# Patient Record
Sex: MALE | Race: WHITE | NOT HISPANIC OR LATINO | Employment: OTHER | URBAN - NONMETROPOLITAN AREA
[De-identification: names, ages, dates, MRNs, and addresses within clinical notes are randomized per-mention and may not be internally consistent; named-entity substitution may affect disease eponyms.]

---

## 2024-07-02 PROCEDURE — 93283 PRGRMG EVAL IMPLANTABLE DFB: CPT | Performed by: INTERNAL MEDICINE

## 2024-07-25 ENCOUNTER — OFFICE VISIT (OUTPATIENT)
Dept: CARDIOLOGY | Facility: CLINIC | Age: 74
End: 2024-07-25
Payer: MEDICARE

## 2024-07-25 VITALS
HEART RATE: 74 BPM | WEIGHT: 187.8 LBS | DIASTOLIC BLOOD PRESSURE: 78 MMHG | HEIGHT: 70 IN | SYSTOLIC BLOOD PRESSURE: 120 MMHG | BODY MASS INDEX: 26.88 KG/M2

## 2024-07-25 DIAGNOSIS — Z78.9 STATIN INTOLERANCE: ICD-10-CM

## 2024-07-25 DIAGNOSIS — I25.810 CORONARY ARTERY DISEASE INVOLVING CORONARY BYPASS GRAFT OF NATIVE HEART WITHOUT ANGINA PECTORIS: ICD-10-CM

## 2024-07-25 DIAGNOSIS — I44.2 COMPLETE HEART BLOCK (MULTI): ICD-10-CM

## 2024-07-25 DIAGNOSIS — Z95.810 PRESENCE OF COMBINATION INTERNAL CARDIAC DEFIBRILLATOR (ICD) AND PACEMAKER: ICD-10-CM

## 2024-07-25 DIAGNOSIS — I48.0 PAROXYSMAL ATRIAL FIBRILLATION (MULTI): ICD-10-CM

## 2024-07-25 DIAGNOSIS — I10 PRIMARY HYPERTENSION: ICD-10-CM

## 2024-07-25 DIAGNOSIS — Z87.891 FORMER SMOKER: ICD-10-CM

## 2024-07-25 DIAGNOSIS — E78.2 MIXED HYPERLIPIDEMIA: ICD-10-CM

## 2024-07-25 DIAGNOSIS — Z95.1 HISTORY OF CORONARY ARTERY BYPASS GRAFT: ICD-10-CM

## 2024-07-25 DIAGNOSIS — Z79.899 HIGH RISK MEDICATION USE: ICD-10-CM

## 2024-07-25 DIAGNOSIS — I50.22 CHRONIC SYSTOLIC HEART FAILURE (MULTI): Primary | ICD-10-CM

## 2024-07-25 PROCEDURE — 3074F SYST BP LT 130 MM HG: CPT | Performed by: INTERNAL MEDICINE

## 2024-07-25 PROCEDURE — 1036F TOBACCO NON-USER: CPT | Performed by: INTERNAL MEDICINE

## 2024-07-25 PROCEDURE — 3078F DIAST BP <80 MM HG: CPT | Performed by: INTERNAL MEDICINE

## 2024-07-25 PROCEDURE — 1159F MED LIST DOCD IN RCRD: CPT | Performed by: INTERNAL MEDICINE

## 2024-07-25 PROCEDURE — 3008F BODY MASS INDEX DOCD: CPT | Performed by: INTERNAL MEDICINE

## 2024-07-25 PROCEDURE — 99214 OFFICE O/P EST MOD 30 MIN: CPT | Performed by: INTERNAL MEDICINE

## 2024-07-25 RX ORDER — SACUBITRIL AND VALSARTAN 24; 26 MG/1; MG/1
1 TABLET, FILM COATED ORAL 2 TIMES DAILY
Qty: 180 TABLET | Refills: 3 | Status: CANCELLED | OUTPATIENT
Start: 2024-07-25 | End: 2025-07-25

## 2024-07-25 NOTE — PROGRESS NOTES
Subjective   Ron Lockwood is a 73 y.o. male       Chief Complaint    Follow-up          HPI   Patient is in the office for follow-up for the problems noted below.  He came with his wife and reports no symptoms of dyspnea palpitations or AICD discharge.  His lipid profile is ordered since he was started taking Repatha.  He had no side effects.  His device recently demonstrated evidence of atrial fibrillation lasting for hours at a time for which Eliquis was initiated.  He had no side effects.  He reports no bleeding complications.  His medical therapy was reviewed and since he has an ejection fraction of 35% we discussed other options of therapy as noted below.       Assessment/recommendations:     1-ischemic cardiomyopathy ejection fraction 35% by echocardiogram March 2024.  Presently compensated.  Will switch patient to Entresto 49-50 mg twice daily replacing ramipril.  Will titrate to the maximal dose on an accelerated schedule.  Will follow renal function.  2-CAD status post three-vessel bypass surgery in [place] 2012, had nuclear stress test at [facility] in [place] couple months ago which we will try to retrieve and also try to retrieve the bypass report from [place].  Currently on guideline directed medical therapy for ischemic heart disease and is without angina pectoris.  3-hyperlipidemia intolerant to high intensity statin, he was started recently on Repatha with no side effects, will follow his lipid profile in few weeks..  4-essential hypertension, currently under control  5-paroxysmal ventricular tachycardia with no recurrences.  No antiarrhythmic therapy is needed  6-complete heart block status post AICD/pacemaker  7-paroxysmal atrial fibrillation requiring anticoagulation with Eliquis, presently reload is not enough and remains asymptomatic to require antiarrhythmic therapy  8-high risk medication with Eliquis, to be monitored              Review of Systems   All other systems reviewed and are  "negative.           Vitals:    07/25/24 1052 07/25/24 1055   BP: 112/76 120/78   BP Location: Left arm Right arm   Patient Position: Sitting Sitting   Pulse: 74    Weight: 85.2 kg (187 lb 12.8 oz)    Height: 1.778 m (5' 10\")         Objective   Physical Exam  Constitutional:       Appearance: Normal appearance.   HENT:      Nose: Nose normal.   Neck:      Vascular: No carotid bruit.   Cardiovascular:      Rate and Rhythm: Normal rate.      Pulses: Normal pulses.      Heart sounds: Normal heart sounds.   Pulmonary:      Effort: Pulmonary effort is normal.   Abdominal:      General: Bowel sounds are normal.      Palpations: Abdomen is soft.   Musculoskeletal:         General: Normal range of motion.      Cervical back: Normal range of motion.      Right lower leg: No edema.      Left lower leg: No edema.   Skin:     General: Skin is warm and dry.   Neurological:      General: No focal deficit present.      Mental Status: He is alert.   Psychiatric:         Mood and Affect: Mood normal.         Behavior: Behavior normal.         Thought Content: Thought content normal.         Judgment: Judgment normal.         Allergies  Patient has no known allergies.     Current Medications    Current Outpatient Medications:     apixaban (Eliquis) 5 mg tablet, Take 1 tablet (5 mg) by mouth 2 times a day., Disp: 180 tablet, Rfl: 3    carvedilol (Coreg) 6.25 mg tablet, Take 1 tablet (6.25 mg) by mouth 2 times a day with meals., Disp: 180 tablet, Rfl: 3    clopidogrel (Plavix) 75 mg tablet, Take 1 tablet (75 mg) by mouth once daily., Disp: 90 tablet, Rfl: 3    evolocumab (Repatha) 140 mg/mL injection, Inject 1 mL (140 mg) under the skin every 14 (fourteen) days., Disp: 6 each, Rfl: 3    ezetimibe (Zetia) 10 mg tablet, Take 1 tablet (10 mg) by mouth once daily., Disp: 90 tablet, Rfl: 3    magnesium oxide (Mag-Ox) 400 mg tablet, Take 1 tablet (400 mg) by mouth once daily., Disp: 90 tablet, Rfl: 3    pantoprazole (ProtoNix) 40 mg EC " tablet, Take 1 tablet (40 mg) by mouth once daily in the morning. Take before meals. Do not crush, chew, or split., Disp: 90 tablet, Rfl: 3    pravastatin (Pravachol) 10 mg tablet, Take 1 tablet (10 mg) by mouth once daily at bedtime., Disp: 90 tablet, Rfl: 3    spironolactone (Aldactone) 25 mg tablet, Take 0.5 tablets (12.5 mg) by mouth once daily., Disp: 45 tablet, Rfl: 3    sacubitriL-valsartan (Entresto) 49-51 mg tablet, Take 1 tablet by mouth 2 times a day., Disp: 180 tablet, Rfl: 3                     Assessment/Plan   1. Chronic systolic heart failure (Multi)  Basic Metabolic Panel    sacubitriL-valsartan (Entresto) 49-51 mg tablet    Follow Up In Cardiology    Basic Metabolic Panel      2. Coronary artery disease involving coronary bypass graft of native heart without angina pectoris  Follow Up In Cardiology      3. Complete heart block (Multi)  Follow Up In Cardiology      4. Presence of combination internal cardiac defibrillator (ICD) and pacemaker        5. History of coronary artery bypass graft        6. Mixed hyperlipidemia        7. Primary hypertension        8. BMI 26.0-26.9,adult        9. Former smoker        10. Statin intolerance        11. Paroxysmal atrial fibrillation (Multi)        12. High risk medication use                 Scribe Attestation  By signing my name below, I, January Lay LPN   attest that this documentation has been prepared under the direction and in the presence of Yoselin Umana MD.     Provider Attestation - Scribe documentation    All medical record entries made by the Scribe were at my direction and personally dictated by me. I have reviewed the chart and agree that the record accurately reflects my personal performance of the history, physical exam, discussion and plan.

## 2024-07-25 NOTE — LETTER
July 25, 2024     Thompson Hurst MD  [address] [facility] [address]  [facility] [address]    Patient: Ron Lockwood   YOB: 1950   Date of Visit: 7/25/2024       Dear Dr. Thompson Hurst MD:    Thank you for referring Ron Lockwood to me for evaluation. Below are my notes for this consultation.  If you have questions, please do not hesitate to call me. I look forward to following your patient along with you.       Sincerely,     Yoselin Umana MD      CC: No Recipients  ______________________________________________________________________________________    Subjective   Ron Lockwood is a 73 y.o. male       Chief Complaint    Follow-up          HPI   Patient is in the office for follow-up for the problems noted below.  He came with his wife and reports no symptoms of dyspnea palpitations or AICD discharge.  His lipid profile is ordered since he was started taking Repatha.  He had no side effects.  His device recently demonstrated evidence of atrial fibrillation lasting for hours at a time for which Eliquis was initiated.  He had no side effects.  He reports no bleeding complications.  His medical therapy was reviewed and since he has an ejection fraction of 35% we discussed other options of therapy as noted below.       Assessment/recommendations:     1-ischemic cardiomyopathy ejection fraction 35% by echocardiogram March 2024.  Presently compensated.  Will switch patient to Entresto 49-50 mg twice daily replacing ramipril.  Will titrate to the maximal dose on an accelerated schedule.  Will follow renal function.  2-CAD status post three-vessel bypass surgery in [place] 2012, had nuclear stress test at [facility] in [place] couple months ago which we will try to retrieve and also try to retrieve the bypass report from [place].  Currently on guideline directed medical therapy for ischemic heart disease and is without angina pectoris.  3-hyperlipidemia intolerant to high intensity statin, he  "was started recently on Repatha with no side effects, will follow his lipid profile in few weeks..  4-essential hypertension, currently under control  5-paroxysmal ventricular tachycardia with no recurrences.  No antiarrhythmic therapy is needed  6-complete heart block status post AICD/pacemaker  7-paroxysmal atrial fibrillation requiring anticoagulation with Eliquis, presently reload is not enough and remains asymptomatic to require antiarrhythmic therapy  8-high risk medication with Eliquis, to be monitored              Review of Systems   All other systems reviewed and are negative.           Vitals:    07/25/24 1052 07/25/24 1055   BP: 112/76 120/78   BP Location: Left arm Right arm   Patient Position: Sitting Sitting   Pulse: 74    Weight: 85.2 kg (187 lb 12.8 oz)    Height: 1.778 m (5' 10\")         Objective   Physical Exam  Constitutional:       Appearance: Normal appearance.   HENT:      Nose: Nose normal.   Neck:      Vascular: No carotid bruit.   Cardiovascular:      Rate and Rhythm: Normal rate.      Pulses: Normal pulses.      Heart sounds: Normal heart sounds.   Pulmonary:      Effort: Pulmonary effort is normal.   Abdominal:      General: Bowel sounds are normal.      Palpations: Abdomen is soft.   Musculoskeletal:         General: Normal range of motion.      Cervical back: Normal range of motion.      Right lower leg: No edema.      Left lower leg: No edema.   Skin:     General: Skin is warm and dry.   Neurological:      General: No focal deficit present.      Mental Status: He is alert.   Psychiatric:         Mood and Affect: Mood normal.         Behavior: Behavior normal.         Thought Content: Thought content normal.         Judgment: Judgment normal.         Allergies  Patient has no known allergies.     Current Medications    Current Outpatient Medications:   •  apixaban (Eliquis) 5 mg tablet, Take 1 tablet (5 mg) by mouth 2 times a day., Disp: 180 tablet, Rfl: 3  •  carvedilol (Coreg) 6.25 mg " tablet, Take 1 tablet (6.25 mg) by mouth 2 times a day with meals., Disp: 180 tablet, Rfl: 3  •  clopidogrel (Plavix) 75 mg tablet, Take 1 tablet (75 mg) by mouth once daily., Disp: 90 tablet, Rfl: 3  •  evolocumab (Repatha) 140 mg/mL injection, Inject 1 mL (140 mg) under the skin every 14 (fourteen) days., Disp: 6 each, Rfl: 3  •  ezetimibe (Zetia) 10 mg tablet, Take 1 tablet (10 mg) by mouth once daily., Disp: 90 tablet, Rfl: 3  •  magnesium oxide (Mag-Ox) 400 mg tablet, Take 1 tablet (400 mg) by mouth once daily., Disp: 90 tablet, Rfl: 3  •  pantoprazole (ProtoNix) 40 mg EC tablet, Take 1 tablet (40 mg) by mouth once daily in the morning. Take before meals. Do not crush, chew, or split., Disp: 90 tablet, Rfl: 3  •  pravastatin (Pravachol) 10 mg tablet, Take 1 tablet (10 mg) by mouth once daily at bedtime., Disp: 90 tablet, Rfl: 3  •  spironolactone (Aldactone) 25 mg tablet, Take 0.5 tablets (12.5 mg) by mouth once daily., Disp: 45 tablet, Rfl: 3  •  sacubitriL-valsartan (Entresto) 49-51 mg tablet, Take 1 tablet by mouth 2 times a day., Disp: 180 tablet, Rfl: 3                     Assessment/Plan   1. Chronic systolic heart failure (Multi)  Basic Metabolic Panel    sacubitriL-valsartan (Entresto) 49-51 mg tablet    Follow Up In Cardiology    Basic Metabolic Panel      2. Coronary artery disease involving coronary bypass graft of native heart without angina pectoris  Follow Up In Cardiology      3. Complete heart block (Multi)  Follow Up In Cardiology      4. Presence of combination internal cardiac defibrillator (ICD) and pacemaker        5. History of coronary artery bypass graft        6. Mixed hyperlipidemia        7. Primary hypertension        8. BMI 26.0-26.9,adult        9. Former smoker        10. Statin intolerance        11. Paroxysmal atrial fibrillation (Multi)        12. High risk medication use                 Scribe Attestation  By signing my name below, Ilda MCCLAIN LPN  , Scribe   attest that this  documentation has been prepared under the direction and in the presence of Yoselin Umana MD.     Provider Attestation - Scribe documentation    All medical record entries made by the Scribe were at my direction and personally dictated by me. I have reviewed the chart and agree that the record accurately reflects my personal performance of the history, physical exam, discussion and plan.

## 2024-07-25 NOTE — PATIENT INSTRUCTIONS
Please bring all medicines, vitamins, and herbal supplements with you when you come to the office.    Prescriptions will not be filled unless you are compliant with your follow up appointments or have a follow up appointment scheduled as per instruction of your physician. Refills should be requested at the time of your visit.     BMI was above normal measurement. Current weight: 85.2 kg (187 lb 12.8 oz)  Weight change since last visit (-) denotes wt loss -1.2 lbs   Weight loss needed to achieve BMI 25: 13.9 Lbs  Weight loss needed to achieve BMI 30: -20.8 Lbs  Provided instructions on dietary changes  Provided instructions on exercise.    Pacemaker/Defibrillator follow up per routine

## 2024-09-16 ENCOUNTER — TELEPHONE (OUTPATIENT)
Dept: CARDIOLOGY | Facility: CLINIC | Age: 74
End: 2024-09-16
Payer: MEDICARE

## 2024-09-16 DIAGNOSIS — Z95.810 AICD (AUTOMATIC CARDIOVERTER/DEFIBRILLATOR) PRESENT: ICD-10-CM

## 2024-09-16 DIAGNOSIS — I50.22 CHRONIC SYSTOLIC HEART FAILURE: ICD-10-CM

## 2024-09-16 DIAGNOSIS — I44.2 COMPLETE HEART BLOCK: ICD-10-CM

## 2024-09-16 NOTE — TELEPHONE ENCOUNTER
Cardiac device check referral at [facility] order prepped for physician signature. Previously Dr. Parker Osborn MD patient.

## 2024-09-19 ENCOUNTER — APPOINTMENT (OUTPATIENT)
Dept: CARDIOLOGY | Facility: CLINIC | Age: 74
End: 2024-09-19
Payer: MEDICARE

## 2024-09-27 ENCOUNTER — TELEPHONE (OUTPATIENT)
Dept: CARDIOLOGY | Facility: CLINIC | Age: 74
End: 2024-09-27
Payer: MEDICARE

## 2024-09-27 NOTE — TELEPHONE ENCOUNTER
Patient's wife phoned that the price of Eliquis went up and can no longer afford this medication. Requesting a cheaper alternative. Please advise.    To Dr. Yoselin Umana MD

## 2024-10-01 ENCOUNTER — APPOINTMENT (OUTPATIENT)
Dept: CARDIOLOGY | Facility: CLINIC | Age: 74
End: 2024-10-01
Payer: MEDICARE

## 2024-10-01 VITALS
BODY MASS INDEX: 26.66 KG/M2 | WEIGHT: 186.2 LBS | DIASTOLIC BLOOD PRESSURE: 74 MMHG | HEIGHT: 70 IN | HEART RATE: 46 BPM | SYSTOLIC BLOOD PRESSURE: 110 MMHG

## 2024-10-01 DIAGNOSIS — I50.22 CHRONIC SYSTOLIC HEART FAILURE: ICD-10-CM

## 2024-10-01 DIAGNOSIS — Z87.891 FORMER SMOKER: ICD-10-CM

## 2024-10-01 PROCEDURE — 3078F DIAST BP <80 MM HG: CPT | Performed by: INTERNAL MEDICINE

## 2024-10-01 PROCEDURE — 99212 OFFICE O/P EST SF 10 MIN: CPT | Performed by: INTERNAL MEDICINE

## 2024-10-01 PROCEDURE — 1159F MED LIST DOCD IN RCRD: CPT | Performed by: INTERNAL MEDICINE

## 2024-10-01 PROCEDURE — 3008F BODY MASS INDEX DOCD: CPT | Performed by: INTERNAL MEDICINE

## 2024-10-01 PROCEDURE — 3074F SYST BP LT 130 MM HG: CPT | Performed by: INTERNAL MEDICINE

## 2024-10-01 PROCEDURE — 1036F TOBACCO NON-USER: CPT | Performed by: INTERNAL MEDICINE

## 2024-10-01 NOTE — PATIENT INSTRUCTIONS
BMI was above normal measurement. Current weight: 84.5 kg (186 lb 3.2 oz)  Weight change since last visit (-) denotes wt loss -1.6 lbs   Weight loss needed to achieve BMI 25: 12.3 Lbs  Weight loss needed to achieve BMI 30: -22.4 Lbs  Provided instructions on dietary changes  Provided instructions on exercise.    Feb visit as scheduled  Increase Entresto to 97/107  B/p check one month  Lab work [facility]

## 2024-10-01 NOTE — PROGRESS NOTES
"Subjective   Ron Lockwood is a 73 y.o. male       Chief Complaint    Follow-up          HPI   Patient is in the office for further titration of heart failure therapy.  He tolerated medium dose Entresto and his blood pressure looks normal.  He has no symptoms of dizziness.  Will increase the Entresto up to 97/23 mg twice daily, we will follow him in 4 weeks and have basic metabolic profile in 2 weeks.  He was advised to watch his blood pressure reading on regular basis until he sees me next time and if he sees a significant drop he need to let me know.  ROS         Vitals:    10/01/24 0930 10/01/24 0933   BP: 128/80 110/74   BP Location: Left arm Right arm   Patient Position: Sitting Sitting   Pulse: (!) 46    Weight: 84.5 kg (186 lb 3.2 oz)    Height: 1.778 m (5' 10\")         Objective   Physical Exam    Allergies  Patient has no known allergies.     Current Medications    Current Outpatient Medications:     apixaban (Eliquis) 5 mg tablet, Take 1 tablet (5 mg) by mouth 2 times a day., Disp: 180 tablet, Rfl: 3    carvedilol (Coreg) 6.25 mg tablet, Take 1 tablet (6.25 mg) by mouth 2 times a day with meals., Disp: 180 tablet, Rfl: 3    clopidogrel (Plavix) 75 mg tablet, Take 1 tablet (75 mg) by mouth once daily., Disp: 90 tablet, Rfl: 3    evolocumab (Repatha) 140 mg/mL injection, Inject 1 mL (140 mg) under the skin every 14 (fourteen) days., Disp: 6 each, Rfl: 3    ezetimibe (Zetia) 10 mg tablet, Take 1 tablet (10 mg) by mouth once daily., Disp: 90 tablet, Rfl: 3    magnesium oxide (Mag-Ox) 400 mg tablet, Take 1 tablet (400 mg) by mouth once daily., Disp: 90 tablet, Rfl: 3    pantoprazole (ProtoNix) 40 mg EC tablet, Take 1 tablet (40 mg) by mouth once daily in the morning. Take before meals. Do not crush, chew, or split., Disp: 90 tablet, Rfl: 3    pravastatin (Pravachol) 10 mg tablet, Take 1 tablet (10 mg) by mouth once daily at bedtime., Disp: 90 tablet, Rfl: 3    spironolactone (Aldactone) 25 mg tablet, Take 0.5 " tablets (12.5 mg) by mouth once daily., Disp: 45 tablet, Rfl: 3    sacubitriL-valsartan (Entresto)  mg tablet, Take 1 tablet by mouth 2 times a day., Disp: 180 tablet, Rfl: 3                     Assessment/Plan   1. Chronic systolic heart failure  Follow Up In Cardiology    Basic Metabolic Panel    Follow Up In Cardiology    sacubitriL-valsartan (Entresto)  mg tablet      2. BMI 26.0-26.9,adult        3. Former smoker                 Scribe Attestation  By signing my name below, Vilma MCCLAIN LPN   , January   attest that this documentation has been prepared under the direction and in the presence of Yoselin Umana MD.     Provider Attestation - Scribe documentation    All medical record entries made by the Scribe were at my direction and personally dictated by me. I have reviewed the chart and agree that the record accurately reflects my personal performance of the history, physical exam, discussion and plan.

## 2024-10-03 NOTE — TELEPHONE ENCOUNTER
Wife returned call. She will discuss with patient and call back with decision. [facility] will also [facility] with [facility] regarding [facility]

## 2024-10-08 NOTE — TELEPHONE ENCOUNTER
Wife returned call. States they filled 3 medications for one month and cost was $600. He is actively working with [facility] for [facility]. She will cont to keep office updated.

## 2024-10-14 NOTE — TELEPHONE ENCOUNTER
Phoned patient and left detailed VM to return call. Will mail letter due have several unsuccessful attempts to reach patient.

## 2024-10-14 NOTE — TELEPHONE ENCOUNTER
Patient's wife phoned is currently going through [facility] Eliquis Rx. Will keep office informed. Patient is compliant with Eliquis therapy.

## 2024-10-23 NOTE — TELEPHONE ENCOUNTER
Ortega from the [facility] phoned that patient never started Entresto due to cost and he had questions that it was titrated up on 10/1/2024. Questioning what dose he should be on once approved. Patient never stopped Ramipril 10mg daily and is still taking it. Phoned wife back to verify, states this is correct. Please advise.    To Dr. Yoselin Umana MD      [facility] phone number: [phone] extension [phone]  Fax number: [phone]

## 2024-10-23 NOTE — TELEPHONE ENCOUNTER
Patient's wife phoned stating the [facility] was [facility] the last two Dr. Yoselin Umana MD OV notes to be faxed to [phone] to get [facility] through the [facility]. Faxed.

## 2024-10-24 NOTE — TELEPHONE ENCOUNTER
Phoned [facility]te[facility] from the [facility] with Dr. Yoselin Umana MD response. States they will mail the Enresto out patient possibly today. Faxed encounter to [facility] as requested. Phoned wife with Dr. Yoselin Umana MD response and verbalized understanding to stay on the Ramipril only until received Entresto from the [facility]. Also, patient will call to make an appointment for 4 weeks with Dr. Yoselin Umana MD after starting Entresto. Verbalized understanding.    To Dr. Yoselin Umana MD

## 2024-11-26 ENCOUNTER — APPOINTMENT (OUTPATIENT)
Dept: CARDIOLOGY | Facility: CLINIC | Age: 74
End: 2024-11-26
Payer: MEDICARE

## 2024-11-26 VITALS
WEIGHT: 187 LBS | HEIGHT: 70 IN | HEART RATE: 60 BPM | DIASTOLIC BLOOD PRESSURE: 64 MMHG | BODY MASS INDEX: 26.77 KG/M2 | SYSTOLIC BLOOD PRESSURE: 110 MMHG

## 2024-11-26 DIAGNOSIS — I50.22 CHRONIC SYSTOLIC HEART FAILURE: ICD-10-CM

## 2024-11-26 DIAGNOSIS — Z87.891 FORMER SMOKER: ICD-10-CM

## 2024-11-26 PROCEDURE — 3074F SYST BP LT 130 MM HG: CPT | Performed by: INTERNAL MEDICINE

## 2024-11-26 PROCEDURE — 99212 OFFICE O/P EST SF 10 MIN: CPT | Performed by: INTERNAL MEDICINE

## 2024-11-26 PROCEDURE — 3008F BODY MASS INDEX DOCD: CPT | Performed by: INTERNAL MEDICINE

## 2024-11-26 PROCEDURE — 3078F DIAST BP <80 MM HG: CPT | Performed by: INTERNAL MEDICINE

## 2024-11-26 NOTE — PROGRESS NOTES
"Subjective   Ron Lockwood is a 74 y.o. male       Chief Complaint    Follow-up          HPI   Patient is in the office for titration of heart failure therapy.  Since he was last seen in the office we learned that he has not been taking the Entresto due to cost.  [facility] and has been taking it for 2 weeks at the lowest dose.  He continued to have symptoms shortness of breath consistent with heart failure.  Emphasized the need for being clear with us on his medications and fortunately his wife was present today and she is the  for that issue.  We agreed to get a basic metabolic profile now and in the absence of any concern we will double the Entresto up to 49/51 mg twice daily and bring him back in 3 weeks for BP check and basic metabolic profile for final titration of the medicine.  He is scheduled to see me in February 2025 and that office visit will be left unchanged.  ROS         Vitals:    11/26/24 0947 11/26/24 0949   BP: 114/68 110/64   BP Location: Left arm Right arm   Patient Position: Sitting Sitting   Pulse: 60    Weight: 84.8 kg (187 lb)    Height: 1.778 m (5' 10\")         Objective   Physical Exam    Allergies  Patient has no known allergies.     Current Medications    Current Outpatient Medications:     apixaban (Eliquis) 5 mg tablet, Take 1 tablet (5 mg) by mouth 2 times a day., Disp: 180 tablet, Rfl: 3    carvedilol (Coreg) 6.25 mg tablet, Take 1 tablet (6.25 mg) by mouth 2 times a day with meals., Disp: 180 tablet, Rfl: 3    clopidogrel (Plavix) 75 mg tablet, Take 1 tablet (75 mg) by mouth once daily., Disp: 90 tablet, Rfl: 3    evolocumab (Repatha) 140 mg/mL injection, Inject 1 mL (140 mg) under the skin every 14 (fourteen) days., Disp: 6 each, Rfl: 3    ezetimibe (Zetia) 10 mg tablet, Take 1 tablet (10 mg) by mouth once daily., Disp: 90 tablet, Rfl: 3    magnesium oxide (Mag-Ox) 400 mg tablet, Take 1 tablet (400 mg) by mouth once daily., Disp: 90 tablet, Rfl: 3    " pantoprazole (ProtoNix) 40 mg EC tablet, Take 1 tablet (40 mg) by mouth once daily in the morning. Take before meals. Do not crush, chew, or split., Disp: 90 tablet, Rfl: 3    pravastatin (Pravachol) 10 mg tablet, Take 1 tablet (10 mg) by mouth once daily at bedtime., Disp: 90 tablet, Rfl: 3    sacubitriL-valsartan (Entresto) 24-26 mg tablet, Take 1 tablet by mouth 2 times a day., Disp: , Rfl:     spironolactone (Aldactone) 25 mg tablet, Take 0.5 tablets (12.5 mg) by mouth once daily., Disp: 45 tablet, Rfl: 3                     Assessment/Plan   1. Chronic systolic heart failure  Follow Up In Cardiology    Basic Metabolic Panel    Follow Up In Cardiology    Basic Metabolic Panel      2. BMI 26.0-26.9,adult        3. Former smoker                 Scribe Attestation  By signing my name below, Vilma MCCLAIN LPN, Scribe   attest that this documentation has been prepared under the direction and in the presence of Yoselin Umana MD.     Provider Attestation - Scribe documentation    All medical record entries made by the Scribe were at my direction and personally dictated by me. I have reviewed the chart and agree that the record accurately reflects my personal performance of the history, physical exam, discussion and plan.

## 2024-11-26 NOTE — PATIENT INSTRUCTIONS
BMI was above normal measurement. Current weight: 84.8 kg (187 lb)  Weight change since last visit (-) denotes wt loss 0.8 lbs   Weight loss needed to achieve BMI 25: 13.1 Lbs  Weight loss needed to achieve BMI 30: -21.6 Lbs  Provided instructions on dietary changes  Provided instructions on exercise.    Lab work  Feb visit as scheduled

## 2024-12-17 ENCOUNTER — APPOINTMENT (OUTPATIENT)
Dept: CARDIOLOGY | Facility: CLINIC | Age: 74
End: 2024-12-17
Payer: MEDICARE

## 2024-12-17 VITALS
SYSTOLIC BLOOD PRESSURE: 114 MMHG | HEART RATE: 50 BPM | HEIGHT: 70 IN | WEIGHT: 190 LBS | BODY MASS INDEX: 27.2 KG/M2 | DIASTOLIC BLOOD PRESSURE: 64 MMHG

## 2024-12-17 DIAGNOSIS — I50.22 CHRONIC SYSTOLIC HEART FAILURE: ICD-10-CM

## 2024-12-17 DIAGNOSIS — Z87.891 FORMER SMOKER: ICD-10-CM

## 2024-12-17 PROCEDURE — 3074F SYST BP LT 130 MM HG: CPT | Performed by: INTERNAL MEDICINE

## 2024-12-17 PROCEDURE — 3008F BODY MASS INDEX DOCD: CPT | Performed by: INTERNAL MEDICINE

## 2024-12-17 PROCEDURE — 1159F MED LIST DOCD IN RCRD: CPT | Performed by: INTERNAL MEDICINE

## 2024-12-17 PROCEDURE — 99212 OFFICE O/P EST SF 10 MIN: CPT | Performed by: INTERNAL MEDICINE

## 2024-12-17 PROCEDURE — 3078F DIAST BP <80 MM HG: CPT | Performed by: INTERNAL MEDICINE

## 2024-12-17 RX ORDER — SACUBITRIL AND VALSARTAN 49; 51 MG/1; MG/1
1 TABLET, FILM COATED ORAL 2 TIMES DAILY
Qty: 180 TABLET | Refills: 3 | Status: SHIPPED | OUTPATIENT
Start: 2024-12-17 | End: 2025-12-17

## 2024-12-17 NOTE — PATIENT INSTRUCTIONS
Please bring all medicines, vitamins, and herbal supplements with you when you come to the office.    Prescriptions will not be filled unless you are compliant with your follow up appointments or have a follow up appointment scheduled as per instruction of your physician. Refills should be requested at the time of your visit.       Increase Entresto to 49/51 one tablet 2 times daily  Lab 2 weeks  B/p check in 2 weeks  Feb visit pending

## 2024-12-17 NOTE — PROGRESS NOTES
"Subjective   Ron Lockwood is a 74 y.o. male       Chief Complaint    BP OV          HPI   Patient is in the office for titration of heart failure therapy with Entresto.  Since initiation of 24/26 mg twice daily he has tolerated the initiation fairly well.  Renal function is normal.  Will double the Entresto up to 49/51 mg twice daily, follow his labs in few weeks and come back in 6 weeks for further titration if possible.  ROS         Vitals:    12/17/24 0936 12/17/24 0937   BP: 116/70 114/64   BP Location: Right arm Left arm   Patient Position: Sitting Sitting   Pulse: 50    Weight: 86.2 kg (190 lb)    Height: 1.778 m (5' 10\")         Objective   Physical Exam    Allergies  Patient has no known allergies.     Current Medications    Current Outpatient Medications:     apixaban (Eliquis) 5 mg tablet, Take 1 tablet (5 mg) by mouth 2 times a day., Disp: 180 tablet, Rfl: 3    carvedilol (Coreg) 6.25 mg tablet, Take 1 tablet (6.25 mg) by mouth 2 times a day with meals., Disp: 180 tablet, Rfl: 3    clopidogrel (Plavix) 75 mg tablet, Take 1 tablet (75 mg) by mouth once daily., Disp: 90 tablet, Rfl: 3    evolocumab (Repatha) 140 mg/mL injection, Inject 1 mL (140 mg) under the skin every 14 (fourteen) days., Disp: 6 each, Rfl: 3    ezetimibe (Zetia) 10 mg tablet, Take 1 tablet (10 mg) by mouth once daily., Disp: 90 tablet, Rfl: 3    magnesium oxide (Mag-Ox) 400 mg tablet, Take 1 tablet (400 mg) by mouth once daily., Disp: 90 tablet, Rfl: 3    pantoprazole (ProtoNix) 40 mg EC tablet, Take 1 tablet (40 mg) by mouth once daily in the morning. Take before meals. Do not crush, chew, or split., Disp: 90 tablet, Rfl: 3    pravastatin (Pravachol) 10 mg tablet, Take 1 tablet (10 mg) by mouth once daily at bedtime., Disp: 90 tablet, Rfl: 3    spironolactone (Aldactone) 25 mg tablet, Take 0.5 tablets (12.5 mg) by mouth once daily., Disp: 45 tablet, Rfl: 3    sacubitriL-valsartan (Entresto) 49-51 mg tablet, Take 1 tablet by mouth 2 " times a day., Disp: 180 tablet, Rfl: 3                     Assessment/Plan   1. Chronic systolic heart failure  Follow Up In Cardiology    sacubitriL-valsartan (Entresto) 49-51 mg tablet    Basic Metabolic Panel    Follow Up In Cardiology    Basic Metabolic Panel      2. BMI 27.0-27.9,adult        3. Former smoker                 Scribe Attestation  By signing my name below, Vilma MCCLAIN LPN   , Scribe   attest that this documentation has been prepared under the direction and in the presence of Yoselin Umana MD.     Provider Attestation - Scribe documentation    All medical record entries made by the Scribe were at my direction and personally dictated by me. I have reviewed the chart and agree that the record accurately reflects my personal performance of the history, physical exam, discussion and plan.

## 2025-01-10 ENCOUNTER — APPOINTMENT (OUTPATIENT)
Dept: CARDIOLOGY | Facility: CLINIC | Age: 75
End: 2025-01-10
Payer: MEDICARE

## 2025-01-10 VITALS
BODY MASS INDEX: 27.11 KG/M2 | SYSTOLIC BLOOD PRESSURE: 116 MMHG | WEIGHT: 189.4 LBS | DIASTOLIC BLOOD PRESSURE: 84 MMHG | HEIGHT: 70 IN | HEART RATE: 44 BPM

## 2025-01-10 DIAGNOSIS — I10 HYPERTENSION, UNSPECIFIED TYPE: ICD-10-CM

## 2025-01-10 DIAGNOSIS — I50.22 CHRONIC SYSTOLIC HEART FAILURE: ICD-10-CM

## 2025-01-10 PROCEDURE — 99212 OFFICE O/P EST SF 10 MIN: CPT | Performed by: INTERNAL MEDICINE

## 2025-01-10 PROCEDURE — 3074F SYST BP LT 130 MM HG: CPT | Performed by: INTERNAL MEDICINE

## 2025-01-10 PROCEDURE — 1159F MED LIST DOCD IN RCRD: CPT | Performed by: INTERNAL MEDICINE

## 2025-01-10 PROCEDURE — 3008F BODY MASS INDEX DOCD: CPT | Performed by: INTERNAL MEDICINE

## 2025-01-10 PROCEDURE — 1036F TOBACCO NON-USER: CPT | Performed by: INTERNAL MEDICINE

## 2025-01-10 PROCEDURE — 3079F DIAST BP 80-89 MM HG: CPT | Performed by: INTERNAL MEDICINE

## 2025-01-10 RX ORDER — CARVEDILOL 3.12 MG/1
3.12 TABLET ORAL 2 TIMES DAILY
Qty: 180 TABLET | Refills: 3 | Status: SHIPPED | OUTPATIENT
Start: 2025-01-10 | End: 2026-01-10

## 2025-01-10 RX ORDER — SACUBITRIL AND VALSARTAN 97; 103 MG/1; MG/1
1 TABLET, FILM COATED ORAL 2 TIMES DAILY
Qty: 180 TABLET | Refills: 3 | Status: SHIPPED | OUTPATIENT
Start: 2025-01-10 | End: 2026-01-10

## 2025-01-10 NOTE — PROGRESS NOTES
"Subjective   Ron Lockwood is a 74 y.o. male       Chief Complaint    Blood Pressure Check          HPI   Patient is in the office for titration of heart failure therapy.  He tolerated maximal dose of Entresto fairly well but was found to be bradycardic today with heart rate of 45 to 50 bpm.  Will reduce carvedilol down to 3.125 mg twice daily and maintain the rest of the medication.  He will come back in couple of months at that time we will initiate therapy with Jardiance and the rationale for that was explained to the patient.  ROS         Vitals:    01/10/25 1330 01/10/25 1334   BP: 118/84 116/84   BP Location: Left arm    Patient Position: Sitting    Pulse: (!) 44    Weight: 85.9 kg (189 lb 6.4 oz)    Height: 1.778 m (5' 10\")         Objective   Physical Exam    Allergies  Patient has no known allergies.     Current Medications    Current Outpatient Medications:     apixaban (Eliquis) 5 mg tablet, Take 1 tablet (5 mg) by mouth 2 times a day., Disp: 180 tablet, Rfl: 3    clopidogrel (Plavix) 75 mg tablet, Take 1 tablet (75 mg) by mouth once daily., Disp: 90 tablet, Rfl: 3    evolocumab (Repatha) 140 mg/mL injection, Inject 1 mL (140 mg) under the skin every 14 (fourteen) days., Disp: 6 each, Rfl: 3    ezetimibe (Zetia) 10 mg tablet, Take 1 tablet (10 mg) by mouth once daily., Disp: 90 tablet, Rfl: 3    magnesium oxide (Mag-Ox) 400 mg tablet, Take 1 tablet (400 mg) by mouth once daily., Disp: 90 tablet, Rfl: 3    pantoprazole (ProtoNix) 40 mg EC tablet, Take 1 tablet (40 mg) by mouth once daily in the morning. Take before meals. Do not crush, chew, or split., Disp: 90 tablet, Rfl: 3    pravastatin (Pravachol) 10 mg tablet, Take 1 tablet (10 mg) by mouth once daily at bedtime., Disp: 90 tablet, Rfl: 3    spironolactone (Aldactone) 25 mg tablet, Take 0.5 tablets (12.5 mg) by mouth once daily., Disp: 45 tablet, Rfl: 3    carvedilol (Coreg) 3.125 mg tablet, Take 1 tablet (3.125 mg) by mouth 2 times a day., Disp: " 180 tablet, Rfl: 3    sacubitriL-valsartan (Entresto)  mg tablet, Take 1 tablet by mouth 2 times a day., Disp: 180 tablet, Rfl: 3                     Assessment/Plan   1. Chronic systolic heart failure  Follow Up In Cardiology    carvedilol (Coreg) 3.125 mg tablet    sacubitriL-valsartan (Entresto)  mg tablet      2. Hypertension, unspecified type  carvedilol (Coreg) 3.125 mg tablet               Scribe Attestation  By signing my name below, IIlda Scribe   attest that this documentation has been prepared under the direction and in the presence of Yoselin Umana MD.     Provider Attestation - Scribe documentation    All medical record entries made by the Scribe were at my direction and personally dictated by me. I have reviewed the chart and agree that the record accurately reflects my personal performance of the history, physical exam, discussion and plan.

## 2025-02-26 ENCOUNTER — APPOINTMENT (OUTPATIENT)
Dept: CARDIOLOGY | Facility: CLINIC | Age: 75
End: 2025-02-26
Payer: MEDICARE

## 2025-02-26 VITALS
SYSTOLIC BLOOD PRESSURE: 102 MMHG | WEIGHT: 191 LBS | BODY MASS INDEX: 27.35 KG/M2 | DIASTOLIC BLOOD PRESSURE: 70 MMHG | HEART RATE: 56 BPM | HEIGHT: 70 IN

## 2025-02-26 DIAGNOSIS — I47.29 PAROXYSMAL VENTRICULAR TACHYCARDIA (MULTI): ICD-10-CM

## 2025-02-26 DIAGNOSIS — Z95.1 HISTORY OF CORONARY ARTERY BYPASS GRAFT: ICD-10-CM

## 2025-02-26 DIAGNOSIS — I44.2 COMPLETE HEART BLOCK: ICD-10-CM

## 2025-02-26 DIAGNOSIS — I50.22 CHRONIC SYSTOLIC HEART FAILURE: ICD-10-CM

## 2025-02-26 DIAGNOSIS — I25.810 CORONARY ARTERY DISEASE INVOLVING CORONARY BYPASS GRAFT OF NATIVE HEART WITHOUT ANGINA PECTORIS: Primary | ICD-10-CM

## 2025-02-26 DIAGNOSIS — E78.2 MIXED HYPERLIPIDEMIA: ICD-10-CM

## 2025-02-26 DIAGNOSIS — Z87.891 FORMER SMOKER: ICD-10-CM

## 2025-02-26 DIAGNOSIS — I10 PRIMARY HYPERTENSION: ICD-10-CM

## 2025-02-26 DIAGNOSIS — Z95.810 AICD (AUTOMATIC CARDIOVERTER/DEFIBRILLATOR) PRESENT: ICD-10-CM

## 2025-02-26 PROCEDURE — 1159F MED LIST DOCD IN RCRD: CPT | Performed by: INTERNAL MEDICINE

## 2025-02-26 PROCEDURE — 99214 OFFICE O/P EST MOD 30 MIN: CPT | Performed by: INTERNAL MEDICINE

## 2025-02-26 PROCEDURE — 3074F SYST BP LT 130 MM HG: CPT | Performed by: INTERNAL MEDICINE

## 2025-02-26 PROCEDURE — 1036F TOBACCO NON-USER: CPT | Performed by: INTERNAL MEDICINE

## 2025-02-26 PROCEDURE — 3008F BODY MASS INDEX DOCD: CPT | Performed by: INTERNAL MEDICINE

## 2025-02-26 PROCEDURE — 3078F DIAST BP <80 MM HG: CPT | Performed by: INTERNAL MEDICINE

## 2025-02-26 NOTE — PATIENT INSTRUCTIONS
Please bring all medicines, vitamins, and herbal supplements with you when you come to the office.    Prescriptions will not be filled unless you are compliant with your follow up appointments or have a follow up appointment scheduled as per instruction of your physician. Refills should be requested at the time of your visit.     Limited Echo in June  Follow up afterwards  Jardiance 25 mg one daily

## 2025-02-26 NOTE — LETTER
February 26, 2025     Thompson Hurst MD  [facility] [address]  [place] [address]    Patient: Ron Lockwood   YOB: 1950   Date of Visit: 2/26/2025       Dear Dr. Thompson Hurst MD:    Thank you for referring Ron Lockwood to me for evaluation. Below are my notes for this consultation.  If you have questions, please do not hesitate to call me. I look forward to following your patient along with you.       Sincerely,     Yoselin Umana MD      CC: No Recipients  ______________________________________________________________________________________    Subjective   Ron Lockwood is a 74 y.o. male       Chief Complaint    Follow-up          HPI     Patient is in the office for follow-up for the problems noted below accompanied by his wife.  Since he was last seen in the office couple months ago he has done very well and continues to do well without any dyspnea orthopnea PND lower extremity edema no AICD discharges and no need for nitroglycerin.  His examination today was unremarkable except for slight overweight.    Assessment/recommendations:     1-ischemic cardiomyopathy ejection fraction 35% by echocardiogram March 2024.  Presently compensated.  Will continue GDMT at maximally tolerated doses and add Jardiance 25 mg daily to complete the guideline recommended therapy and follow ejection fraction in 6 months.  2-CAD status post three-vessel bypass surgery in [place] 2012, had nuclear stress test at [facility] in [place] couple months ago which we will try to retrieve and also try to retrieve the bypass report from [place].  Currently on guideline directed medical therapy for ischemic heart disease and is without angina pectoris.  3-hyperlipidemia intolerant to statin currently on 10 mg daily of pravastatin along with ezetimibe 10 mg daily.  Remains under control  4-hypertension, currently under control  5-paroxysmal ventricular tachycardia with no recurrences.  No antiarrhythmic  "therapy is needed patient has AICD followed in the pacemaker clinic  6-complete heart block status post AICD/pacemaker followed in pacemaker clinic  7-overweight, encouraged the patient to work harder on losing weight.  .  Review of Systems   All other systems reviewed and are negative.           Vitals:    02/26/25 0919   BP: 102/70   BP Location: Left arm   Patient Position: Sitting   Pulse: 56   Weight: 86.6 kg (191 lb)   Height: 1.778 m (5' 10\")        Objective   Physical Exam  Constitutional:       Appearance: Normal appearance.   HENT:      Nose: Nose normal.   Neck:      Vascular: No carotid bruit.   Cardiovascular:      Rate and Rhythm: Normal rate.      Pulses: Normal pulses.      Heart sounds: Normal heart sounds.   Pulmonary:      Effort: Pulmonary effort is normal.   Abdominal:      General: Bowel sounds are normal.      Palpations: Abdomen is soft.   Musculoskeletal:         General: Normal range of motion.      Cervical back: Normal range of motion.      Right lower leg: No edema.      Left lower leg: No edema.   Skin:     General: Skin is warm and dry.   Neurological:      General: No focal deficit present.      Mental Status: He is alert.   Psychiatric:         Mood and Affect: Mood normal.         Behavior: Behavior normal.         Thought Content: Thought content normal.         Judgment: Judgment normal.         Allergies  Patient has no known allergies.     Current Medications    Current Outpatient Medications:   •  apixaban (Eliquis) 5 mg tablet, Take 1 tablet (5 mg) by mouth 2 times a day., Disp: 180 tablet, Rfl: 3  •  carvedilol (Coreg) 3.125 mg tablet, Take 1 tablet (3.125 mg) by mouth 2 times a day., Disp: 180 tablet, Rfl: 3  •  clopidogrel (Plavix) 75 mg tablet, Take 1 tablet (75 mg) by mouth once daily., Disp: 90 tablet, Rfl: 3  •  evolocumab (Repatha) 140 mg/mL injection, Inject 1 mL (140 mg) under the skin every 14 (fourteen) days., Disp: 6 each, Rfl: 3  •  ezetimibe (Zetia) 10 mg " tablet, Take 1 tablet (10 mg) by mouth once daily., Disp: 90 tablet, Rfl: 3  •  magnesium oxide (Mag-Ox) 400 mg tablet, Take 1 tablet (400 mg) by mouth once daily., Disp: 90 tablet, Rfl: 3  •  pantoprazole (ProtoNix) 40 mg EC tablet, Take 1 tablet (40 mg) by mouth once daily in the morning. Take before meals. Do not crush, chew, or split., Disp: 90 tablet, Rfl: 3  •  pravastatin (Pravachol) 10 mg tablet, Take 1 tablet (10 mg) by mouth once daily at bedtime., Disp: 90 tablet, Rfl: 3  •  sacubitriL-valsartan (Entresto)  mg tablet, Take 1 tablet by mouth 2 times a day., Disp: 180 tablet, Rfl: 3  •  spironolactone (Aldactone) 25 mg tablet, Take 0.5 tablets (12.5 mg) by mouth once daily., Disp: 45 tablet, Rfl: 3  •  empagliflozin (Jardiance) 25 mg, Take 1 tablet (25 mg) by mouth once daily., Disp: 90 tablet, Rfl: 3                     Assessment/Plan   1. Coronary artery disease involving coronary bypass graft of native heart without angina pectoris  Follow Up In Cardiology    Follow Up In Cardiology      2. Chronic systolic heart failure  Transthoracic Echo Limited    empagliflozin (Jardiance) 25 mg      3. Complete heart block        4. AICD (automatic cardioverter/defibrillator) present        5. Primary hypertension        6. History of coronary artery bypass graft        7. Statin intolerance        8. Mixed hyperlipidemia        9. BMI 27.0-27.9,adult        10. Former smoker                 Scribe Attestation  By signing my name below, Vilma MCCLAIN LPN, Scribe   attest that this documentation has been prepared under the direction and in the presence of Yoselin Umana MD.     Provider Attestation - Scribe documentation    All medical record entries made by the Scribe were at my direction and personally dictated by me. I have reviewed the chart and agree that the record accurately reflects my personal performance of the history, physical exam, discussion and plan.

## 2025-02-26 NOTE — PROGRESS NOTES
"Subjective   Ron Lockwood is a 74 y.o. male       Chief Complaint    Follow-up          HPI     Patient is in the office for follow-up for the problems noted below accompanied by his wife.  Since he was last seen in the office couple months ago he has done very well and continues to do well without any dyspnea orthopnea PND lower extremity edema no AICD discharges and no need for nitroglycerin.  His examination today was unremarkable except for slight overweight.    Assessment/recommendations:     1-ischemic cardiomyopathy ejection fraction 35% by echocardiogram March 2024.  Presently compensated.  Will continue GDMT at maximally tolerated doses and add Jardiance 25 mg daily to complete the guideline recommended therapy and follow ejection fraction in 6 months.  2-CAD status post three-vessel bypass surgery in [place] 2012, had nuclear stress test at [facility] in [place] couple months ago which we will try to retrieve and also try to retrieve the bypass report from [place].  Currently on guideline directed medical therapy for ischemic heart disease and is without angina pectoris.  3-hyperlipidemia intolerant to statin currently on 10 mg daily of pravastatin along with ezetimibe 10 mg daily.  Remains under control  4-hypertension, currently under control  5-paroxysmal ventricular tachycardia with no recurrences.  No antiarrhythmic therapy is needed patient has AICD followed in the pacemaker clinic  6-complete heart block status post AICD/pacemaker followed in pacemaker clinic  7-overweight, encouraged the patient to work harder on losing weight.  .  Review of Systems   All other systems reviewed and are negative.           Vitals:    02/26/25 0919   BP: 102/70   BP Location: Left arm   Patient Position: Sitting   Pulse: 56   Weight: 86.6 kg (191 lb)   Height: 1.778 m (5' 10\")        Objective   Physical Exam  Constitutional:       Appearance: Normal appearance.   HENT:      Nose: Nose normal.   Neck:      " Vascular: No carotid bruit.   Cardiovascular:      Rate and Rhythm: Normal rate.      Pulses: Normal pulses.      Heart sounds: Normal heart sounds.   Pulmonary:      Effort: Pulmonary effort is normal.   Abdominal:      General: Bowel sounds are normal.      Palpations: Abdomen is soft.   Musculoskeletal:         General: Normal range of motion.      Cervical back: Normal range of motion.      Right lower leg: No edema.      Left lower leg: No edema.   Skin:     General: Skin is warm and dry.   Neurological:      General: No focal deficit present.      Mental Status: He is alert.   Psychiatric:         Mood and Affect: Mood normal.         Behavior: Behavior normal.         Thought Content: Thought content normal.         Judgment: Judgment normal.         Allergies  Patient has no known allergies.     Current Medications    Current Outpatient Medications:     apixaban (Eliquis) 5 mg tablet, Take 1 tablet (5 mg) by mouth 2 times a day., Disp: 180 tablet, Rfl: 3    carvedilol (Coreg) 3.125 mg tablet, Take 1 tablet (3.125 mg) by mouth 2 times a day., Disp: 180 tablet, Rfl: 3    clopidogrel (Plavix) 75 mg tablet, Take 1 tablet (75 mg) by mouth once daily., Disp: 90 tablet, Rfl: 3    evolocumab (Repatha) 140 mg/mL injection, Inject 1 mL (140 mg) under the skin every 14 (fourteen) days., Disp: 6 each, Rfl: 3    ezetimibe (Zetia) 10 mg tablet, Take 1 tablet (10 mg) by mouth once daily., Disp: 90 tablet, Rfl: 3    magnesium oxide (Mag-Ox) 400 mg tablet, Take 1 tablet (400 mg) by mouth once daily., Disp: 90 tablet, Rfl: 3    pantoprazole (ProtoNix) 40 mg EC tablet, Take 1 tablet (40 mg) by mouth once daily in the morning. Take before meals. Do not crush, chew, or split., Disp: 90 tablet, Rfl: 3    pravastatin (Pravachol) 10 mg tablet, Take 1 tablet (10 mg) by mouth once daily at bedtime., Disp: 90 tablet, Rfl: 3    sacubitriL-valsartan (Entresto)  mg tablet, Take 1 tablet by mouth 2 times a day., Disp: 180 tablet,  Rfl: 3    spironolactone (Aldactone) 25 mg tablet, Take 0.5 tablets (12.5 mg) by mouth once daily., Disp: 45 tablet, Rfl: 3    empagliflozin (Jardiance) 25 mg, Take 1 tablet (25 mg) by mouth once daily., Disp: 90 tablet, Rfl: 3                     Assessment/Plan   1. Coronary artery disease involving coronary bypass graft of native heart without angina pectoris  Follow Up In Cardiology    Follow Up In Cardiology      2. Chronic systolic heart failure  Transthoracic Echo Limited    empagliflozin (Jardiance) 25 mg      3. Complete heart block        4. AICD (automatic cardioverter/defibrillator) present        5. Primary hypertension        6. History of coronary artery bypass graft        7. Statin intolerance        8. Mixed hyperlipidemia        9. BMI 27.0-27.9,adult        10. Former smoker                 Scribe Attestation  By signing my name below, Vilma MCCLAIN LPN, Scribe   attest that this documentation has been prepared under the direction and in the presence of Yoselin Umana MD.     Provider Attestation - Scribe documentation    All medical record entries made by the Scribe were at my direction and personally dictated by me. I have reviewed the chart and agree that the record accurately reflects my personal performance of the history, physical exam, discussion and plan.

## 2025-03-30 DIAGNOSIS — I25.10 CORONARY ARTERY DISEASE, UNSPECIFIED VESSEL OR LESION TYPE, UNSPECIFIED WHETHER ANGINA PRESENT, UNSPECIFIED WHETHER NATIVE OR TRANSPLANTED HEART: ICD-10-CM

## 2025-03-30 DIAGNOSIS — E78.2 MIXED HYPERLIPIDEMIA: ICD-10-CM

## 2025-04-02 RX ORDER — EVOLOCUMAB 140 MG/ML
140 INJECTION, SOLUTION SUBCUTANEOUS
Qty: 6 EACH | Refills: 3 | Status: SHIPPED | OUTPATIENT
Start: 2025-04-02 | End: 2026-04-02

## 2025-04-11 DIAGNOSIS — E78.2 MIXED HYPERLIPIDEMIA: ICD-10-CM

## 2025-04-13 DIAGNOSIS — Z95.1 HISTORY OF CORONARY ARTERY BYPASS GRAFT: ICD-10-CM

## 2025-04-13 DIAGNOSIS — I10 PRIMARY HYPERTENSION: ICD-10-CM

## 2025-04-13 DIAGNOSIS — I50.22 CHRONIC SYSTOLIC HEART FAILURE: ICD-10-CM

## 2025-04-13 DIAGNOSIS — I25.10 CORONARY ARTERY DISEASE, UNSPECIFIED VESSEL OR LESION TYPE, UNSPECIFIED WHETHER ANGINA PRESENT, UNSPECIFIED WHETHER NATIVE OR TRANSPLANTED HEART: ICD-10-CM

## 2025-04-14 RX ORDER — PRAVASTATIN SODIUM 10 MG/1
10 TABLET ORAL NIGHTLY
Qty: 90 TABLET | Refills: 3 | Status: SHIPPED | OUTPATIENT
Start: 2025-04-14

## 2025-04-14 RX ORDER — LANOLIN ALCOHOL/MO/W.PET/CERES
1 CREAM (GRAM) TOPICAL DAILY
Qty: 90 TABLET | Refills: 3 | Status: SHIPPED | OUTPATIENT
Start: 2025-04-14 | End: 2026-04-14

## 2025-04-14 RX ORDER — PANTOPRAZOLE SODIUM 40 MG/1
40 TABLET, DELAYED RELEASE ORAL
Qty: 90 TABLET | Refills: 3 | Status: SHIPPED | OUTPATIENT
Start: 2025-04-14 | End: 2026-04-14

## 2025-04-19 DIAGNOSIS — I10 HYPERTENSION, UNSPECIFIED TYPE: ICD-10-CM

## 2025-04-19 DIAGNOSIS — I50.22 CHRONIC SYSTOLIC HEART FAILURE: ICD-10-CM

## 2025-04-19 DIAGNOSIS — Z95.1 HISTORY OF CORONARY ARTERY BYPASS GRAFT: ICD-10-CM

## 2025-04-21 RX ORDER — SPIRONOLACTONE 25 MG/1
25 TABLET ORAL DAILY
Qty: 45 TABLET | Refills: 3 | Status: SHIPPED | OUTPATIENT
Start: 2025-04-21

## 2025-04-21 RX ORDER — CLOPIDOGREL BISULFATE 75 MG/1
75 TABLET ORAL DAILY
Qty: 90 TABLET | Refills: 3 | Status: SHIPPED | OUTPATIENT
Start: 2025-04-21

## 2025-04-26 DIAGNOSIS — I25.10 CORONARY ARTERY DISEASE, UNSPECIFIED VESSEL OR LESION TYPE, UNSPECIFIED WHETHER ANGINA PRESENT, UNSPECIFIED WHETHER NATIVE OR TRANSPLANTED HEART: ICD-10-CM

## 2025-04-26 DIAGNOSIS — E78.2 MIXED HYPERLIPIDEMIA: ICD-10-CM

## 2025-04-28 RX ORDER — EVOLOCUMAB 140 MG/ML
INJECTION, SOLUTION SUBCUTANEOUS
Qty: 2 ML | Refills: 3 | Status: SHIPPED | OUTPATIENT
Start: 2025-04-28

## 2025-05-19 ENCOUNTER — PATIENT OUTREACH (OUTPATIENT)
Dept: CARDIOLOGY | Facility: CLINIC | Age: 75
End: 2025-05-19
Payer: MEDICARE

## 2025-05-20 ENCOUNTER — TELEPHONE (OUTPATIENT)
Dept: CARDIOLOGY | Facility: CLINIC | Age: 75
End: 2025-05-20

## 2025-05-20 ENCOUNTER — APPOINTMENT (OUTPATIENT)
Dept: CARDIOLOGY | Facility: CLINIC | Age: 75
End: 2025-05-20
Payer: MEDICARE

## 2025-05-20 VITALS
HEART RATE: 60 BPM | WEIGHT: 184.6 LBS | HEIGHT: 70 IN | SYSTOLIC BLOOD PRESSURE: 126 MMHG | BODY MASS INDEX: 26.43 KG/M2 | DIASTOLIC BLOOD PRESSURE: 78 MMHG

## 2025-05-20 DIAGNOSIS — I50.22 CHRONIC SYSTOLIC HEART FAILURE: ICD-10-CM

## 2025-05-20 DIAGNOSIS — I48.0 PAROXYSMAL ATRIAL FIBRILLATION (MULTI): ICD-10-CM

## 2025-05-20 DIAGNOSIS — Z78.9 STATIN INTOLERANCE: ICD-10-CM

## 2025-05-20 DIAGNOSIS — I10 PRIMARY HYPERTENSION: ICD-10-CM

## 2025-05-20 DIAGNOSIS — E78.2 MIXED HYPERLIPIDEMIA: ICD-10-CM

## 2025-05-20 DIAGNOSIS — Z95.810 AICD (AUTOMATIC CARDIOVERTER/DEFIBRILLATOR) PRESENT: ICD-10-CM

## 2025-05-20 DIAGNOSIS — I44.2 COMPLETE HEART BLOCK: ICD-10-CM

## 2025-05-20 DIAGNOSIS — Z98.61 H/O PERCUTANEOUS TRANSLUMINAL CORONARY ANGIOPLASTY: ICD-10-CM

## 2025-05-20 DIAGNOSIS — R10.9 STOMACH PAIN: ICD-10-CM

## 2025-05-20 DIAGNOSIS — Z79.899 HIGH RISK MEDICATION USE: ICD-10-CM

## 2025-05-20 DIAGNOSIS — Z87.891 FORMER SMOKER: ICD-10-CM

## 2025-05-20 DIAGNOSIS — Z95.1 HISTORY OF CORONARY ARTERY BYPASS GRAFT: ICD-10-CM

## 2025-05-20 DIAGNOSIS — I25.10 CORONARY ARTERY DISEASE, UNSPECIFIED VESSEL OR LESION TYPE, UNSPECIFIED WHETHER ANGINA PRESENT, UNSPECIFIED WHETHER NATIVE OR TRANSPLANTED HEART: ICD-10-CM

## 2025-05-20 PROBLEM — I48.91 ATRIAL FIBRILLATION (MULTI): Status: ACTIVE | Noted: 2025-05-20

## 2025-05-20 PROCEDURE — 3074F SYST BP LT 130 MM HG: CPT | Performed by: INTERNAL MEDICINE

## 2025-05-20 PROCEDURE — 1159F MED LIST DOCD IN RCRD: CPT | Performed by: INTERNAL MEDICINE

## 2025-05-20 PROCEDURE — 3008F BODY MASS INDEX DOCD: CPT | Performed by: INTERNAL MEDICINE

## 2025-05-20 PROCEDURE — 3078F DIAST BP <80 MM HG: CPT | Performed by: INTERNAL MEDICINE

## 2025-05-20 PROCEDURE — 99496 TRANSJ CARE MGMT HIGH F2F 7D: CPT | Performed by: INTERNAL MEDICINE

## 2025-05-20 PROCEDURE — 1036F TOBACCO NON-USER: CPT | Performed by: INTERNAL MEDICINE

## 2025-05-20 PROCEDURE — 93000 ELECTROCARDIOGRAM COMPLETE: CPT | Performed by: INTERNAL MEDICINE

## 2025-05-20 RX ORDER — ASPIRIN 81 MG/1
81 TABLET ORAL DAILY
COMMUNITY

## 2025-05-20 RX ORDER — AMIODARONE HYDROCHLORIDE 200 MG/1
400 TABLET ORAL 2 TIMES DAILY
COMMUNITY
End: 2025-05-20 | Stop reason: DRUGHIGH

## 2025-05-20 RX ORDER — PANTOPRAZOLE SODIUM 40 MG/1
40 TABLET, DELAYED RELEASE ORAL 2 TIMES DAILY
Qty: 180 TABLET | Refills: 1 | Status: SHIPPED | OUTPATIENT
Start: 2025-05-20

## 2025-05-20 RX ORDER — AMIODARONE HYDROCHLORIDE 200 MG/1
200 TABLET ORAL 2 TIMES DAILY
Qty: 180 TABLET | Refills: 1 | Status: SHIPPED | OUTPATIENT
Start: 2025-05-20

## 2025-05-20 NOTE — PATIENT INSTRUCTIONS
Please bring all medicines, vitamins, and herbal supplements with you when you come to the office.    Prescriptions will not be filled unless you are compliant with your follow up appointments or have a follow up appointment scheduled as per instruction of your physician. Refills should be requested at the time of your visit.    BMI was above normal measurement. Current weight: 83.7 kg (184 lb 9.6 oz)  Weight change since last visit (-) denotes wt loss -6.4 lbs   Weight loss needed to achieve BMI 25: 10.7 Lbs  Weight loss needed to achieve BMI 30: -24 Lbs  Provided instructions on dietary changes  Provided instructions on exercise.    Pacemaker/Defibrillator follow up per routine   Amiodarone follow up per routine   October visit as as scheduled  Increase protonix  Reduce Amio to 200 mg one tablet 2 times daily

## 2025-05-20 NOTE — PROGRESS NOTES
HPI    Patient is in the office for follow-up for TCM visit after recent admission to [facility] with AICD discharge caused by atrial fibrillation with RVR.  He was placed on amiodarone and anticoagulation with no recurrent disease.  His AICD analysis revealed atrial fibrillation but also demonstrated episode of ventricular tachycardia leading to overdrive pacing.  Patient was not aware of those events.  His cardiac catheterization during the admission revealed patent LIMA graft to the LAD and occluded vein graft to the RCA with high-grade lesion in the left circumflex which was treated with drug-eluting stent.  His ejection fraction measured 40%.  Patient was discharged home in stable condition and came with his wife today.  His EKG today confirmed AV sequential pacemaker.  We discussed long-term therapy for atrial fibrillation with amiodarone and long-term anticoagulation.  Amiodarone testing was discussed today.  Presently he is back to his baseline.    Assessment/recommendations:     1-ischemic cardiomyopathy ejection fraction 35-40 % by echocardiogram March 2024.  Presently compensated.  Will continue GDMT at maximally tolerated doses   2-CAD status post three-vessel bypass surgery in [place] 2012, cardiac catheterization May 2025 demonstrated patent LIMA graft to the LAD, occluded vein graft to the RCA and high-grade lesion in the left circumflex requiring drug-eluting stent.  Continue antiplatelet therapy with aspirin and Plavix, after 1 months and drop the aspirin and leave the Plavix since he is on Eliquis.  Continue aggressive modification of risk factor for CAD  3-hyperlipidemia intolerant to statin currently on 10 mg daily of pravastatin along with ezetimibe 10 mg daily.  Remains under control  4-essential hypertension, currently under control  5-paroxysmal ventricular tachycardia confirmed by recent AICD analysis, presently on amiodarone for atrial fibrillation which will help prevent recurrent ventricular  "tachycardia.  6-complete heart block status post AICD/pacemaker followed in pacemaker clinic  7-paroxysmal atrial fibrillation leading to AICD discharge May 2025, presently on loading dose of amiodarone, the dose will be reduced down to 200 mg twice daily, continue Eliquis.  8-status post PCI of left circumflex May 2025, continue dual antiplatelet therapy and high intensity statin.  Drop the aspirin 4 weeks since patient is on Eliquis  Review of Systems   All other systems reviewed and are negative.           Vitals:    05/20/25 1114   BP: 126/78   BP Location: Left arm   Patient Position: Sitting   Pulse: 60   Weight: 83.7 kg (184 lb 9.6 oz)   Height: 1.778 m (5' 10\")        Objective   Physical Exam  Constitutional:       Appearance: Normal appearance.   HENT:      Nose: Nose normal.   Neck:      Vascular: No carotid bruit.   Cardiovascular:      Rate and Rhythm: Normal rate.      Pulses: Normal pulses.      Heart sounds: Normal heart sounds.   Pulmonary:      Effort: Pulmonary effort is normal.   Abdominal:      General: Bowel sounds are normal.      Palpations: Abdomen is soft.   Musculoskeletal:         General: Normal range of motion.      Cervical back: Normal range of motion.      Right lower leg: No edema.      Left lower leg: No edema.   Skin:     General: Skin is warm and dry.   Neurological:      General: No focal deficit present.      Mental Status: He is alert.   Psychiatric:         Mood and Affect: Mood normal.         Behavior: Behavior normal.         Thought Content: Thought content normal.         Judgment: Judgment normal.         Allergies  Patient has no known allergies.     Current Medications  Current Outpatient Medications   Medication Instructions    apixaban (ELIQUIS) 5 mg, oral, 2 times daily    aspirin 81 mg, Daily    carvedilol (COREG) 3.125 mg, oral, 2 times daily    clopidogrel (PLAVIX) 75 mg, oral, Daily    empagliflozin (JARDIANCE) 25 mg, oral, Daily    ezetimibe (ZETIA) 10 mg, " oral, Daily    magnesium oxide (MAG-OX) 400 mg, oral, Daily    pravastatin (PRAVACHOL) 10 mg, oral, Nightly    Repatha Syringe 140 mg/mL injection INJECT 1 SYRINGE SUBCUTANEOUSLY EVERY 14 DAYS    sacubitriL-valsartan (Entresto)  mg tablet 1 tablet, oral, 2 times daily    spironolactone (ALDACTONE) 25 mg, oral, Daily                        Assessment/Plan   1. Coronary artery disease, unspecified vessel or lesion type, unspecified whether angina present, unspecified whether native or transplanted heart        2. Chronic systolic heart failure        3. Complete heart block        4. AICD (automatic cardioverter/defibrillator) present        5. Atrial fibrillation, unspecified type (Multi)        6. Primary hypertension        7. Mixed hyperlipidemia        8. Statin intolerance        9. History of coronary artery bypass graft        10. Former smoker        11. BMI 26.0-26.9,adult        12. H/O percutaneous transluminal coronary angioplasty        13. Stomach pain        14. High risk medication use                 Scribe Attestation  By signing my name below, Vilma MCCLAIN LPN, Scribe   attest that this documentation has been prepared under the direction and in the presence of Yoselin Umana MD.     Provider Attestation - Scribe documentation    All medical record entries made by the Scribe were at my direction and personally dictated by me. I have reviewed the chart and agree that the record accurately reflects my personal performance of the history, physical exam, discussion and plan.

## 2025-05-22 LAB — NON-UH HIE THYROID STIMULATING HORMONE: 2.87 U[IU]/ML (ref 0.45–5.33)

## 2025-05-23 ENCOUNTER — TELEPHONE (OUTPATIENT)
Dept: CARDIOLOGY | Facility: CLINIC | Age: 75
End: 2025-05-23
Payer: MEDICARE

## 2025-05-23 NOTE — TELEPHONE ENCOUNTER
Result Communication    Resulted Orders   [facility] HIE Thyroid Stimulating Hormone   Result Value Ref Range    [facility] Thyroid Stimulating Hormone 2.87 0.45 - 5.33 u[iU]/mL      Comment:      PERFORMED BY:[facility] [place], [place] [id number] MEDICAL DIRECTORMOTAMARA NEWELL M.D.       8:47 AM      Results were successfully communicated with the patient and they acknowledged their understanding.

## 2025-05-23 NOTE — TELEPHONE ENCOUNTER
----- Message from Yoselin Umana sent at 5/22/2025  3:40 PM EDT -----  No new orders. Please call patient with result. Within normal limits.   ----- Message -----  From: Sarah Vargas - Lab Results In  Sent: 5/22/2025   1:13 PM EDT  To: Yoselin Umana MD

## 2025-06-03 ENCOUNTER — PATIENT OUTREACH (OUTPATIENT)
Dept: CARDIOLOGY | Facility: CLINIC | Age: 75
End: 2025-06-03
Payer: MEDICARE

## 2025-06-03 NOTE — PROGRESS NOTES
Confirmation of at least 2 patient identifiers.    Completed telephonic follow-up with patient after recent visit with Dr Umana 5/20/25  Spoke to patient during outreach call.    Patient reports feeling: Improved    Patient has questions or concerns about medications: No    Have all prescribed medications been filled? Yes    Patient has necessary resources to manage their care? Yes    Patient has questions or concerns? No    Next care management follow-up approximately within one month.  Care  information provided to patient.

## 2025-06-09 ENCOUNTER — HOSPITAL ENCOUNTER (OUTPATIENT)
Dept: CARDIOLOGY | Facility: CLINIC | Age: 75
Discharge: HOME | End: 2025-06-09
Payer: MEDICARE

## 2025-06-09 VITALS
BODY MASS INDEX: 27.35 KG/M2 | DIASTOLIC BLOOD PRESSURE: 72 MMHG | HEIGHT: 70 IN | SYSTOLIC BLOOD PRESSURE: 130 MMHG | WEIGHT: 191 LBS

## 2025-06-09 DIAGNOSIS — I50.22 CHRONIC SYSTOLIC HEART FAILURE: ICD-10-CM

## 2025-06-09 PROCEDURE — 93308 TTE F-UP OR LMTD: CPT

## 2025-06-09 PROCEDURE — 93308 TTE F-UP OR LMTD: CPT | Performed by: INTERNAL MEDICINE

## 2025-06-10 LAB
EJECTION FRACTION APICAL 4 CHAMBER: 46.4
EJECTION FRACTION: 33 %
LEFT ATRIUM VOLUME AREA LENGTH INDEX BSA: 33.6 ML/M2
LEFT VENTRICLE INTERNAL DIMENSION DIASTOLE: 4.59 CM (ref 3.5–6)
LEFT VENTRICULAR OUTFLOW TRACT DIAMETER: 2.57 CM
LV EJECTION FRACTION BIPLANE: 44 %

## 2025-06-11 ENCOUNTER — TELEPHONE (OUTPATIENT)
Dept: CARDIOLOGY | Facility: CLINIC | Age: 75
End: 2025-06-11
Payer: MEDICARE

## 2025-06-11 NOTE — TELEPHONE ENCOUNTER
Result Communication    Resulted Orders   Transthoracic Echo Limited   Result Value Ref Range    LV Biplane EF 44 %    LVOT diam 2.57 cm    LA vol index A/L 33.6 ml/m2    LV EF 33 %    LVIDd 4.59 cm    LV A4C EF 46.4     Narrative                   [facility]  [address], [address], [place] [address]          Tel [phone] Fax [phone]    TRANSTHORACIC ECHOCARDIOGRAM REPORT    Patient Name:       DUYEN Jewell Physician:    [id number] Zachariah Umana MD, [facility]  Study Date:         6/9/2025            Ordering Provider:    [id number] ZACHARIAH UMANA  MRN/PID:            [id number]            Fellow:  Accession#:         [id number]        Nurse:  Date of Birth/Age:  1950 / 74     Sonographer:          Melissa carey                                     RDCS, RVT  Gender Assigned at                     Additional Staff:  Birth:  Height:             177.80 cm           Admit Date:  Weight:             86.64 kg            Admission Status:     Outpatient  BSA / BMI:          2.05 m2 / 27.41     Department Location:  [facility]                      kg/m2                                     [place]  Blood Pressure: 130 /72 mmHg    Study Type:    TRANSTHORACIC ECHO (TTE) LIMITED  Diagnosis/ICD: Chronic systolic (congestive) heart failure (CHF)-I50.22  Indication:    Abnormal EKG-CHB, Pacemaker, AICD, Paroxysmal Ventricular                 Tachycardia, HTN, CAD, Hyperlipidemia, CABG-2012, PTCA-5/15/2025,                 Former Smoker, Overweight, Ischemic Cardiomyopathy  CPT Codes:     Echo Limited-87871   Study Detail: The following Echo studies were performed: 2D and M-Mode.       PHYSICIAN INTERPRETATION:  Left Ventricle: Left ventricular ejection fraction is moderately decreased by visual estimate at 30-35%. There is mild left  ventricular hypertrophy. There is global hypokinesis of the left ventricle with minor regional variations. The left ventricular cavity size is normal. There is mild increased septal and mildly increased posterior left ventricular wall thickness. There is left ventricular concentric remodeling. Left ventricular diastolic filling was not assessed.  Left Atrium: The left atrial size is normal.  Right Ventricle: The right ventricle is normal in size. There is normal right ventricular global systolic function. A pacemaker/defibrillator wire is seen in the right ventricle.  Right Atrium: The right atrial size is normal.  Aortic Valve: The aortic valve is trileaflet. Aortic valve regurgitation was not assessed.  Mitral Valve: The mitral valve is normal in structure. Mitral valve regurgitation was not assessed.  Tricuspid Valve: The tricuspid valve is structurally normal. Tricuspid regurgitation was not assessed.  Pulmonic Valve: The pulmonic valve is structurally normal. The pulmonic valve regurgitation was not assessed.  Pericardium: No pericardial effusion noted.  Aorta: The aortic root is normal.  Systemic Veins: The inferior vena cava appears normal in size, with IVC inspiratory collapse greater than 50%.  In comparison to the previous echocardiogram(s): When compared to a study from 4/1/2024, no significant interval changes were seen.       CONCLUSIONS:   1. Left ventricular ejection fraction is moderately decreased by visual estimate at 30-35%.   2. There is global hypokinesis of the left ventricle with minor regional variations.   3. There is normal right ventricular global systolic function.   4. A pacemaker/defibrillator wire is seen in the right ventricle.   5. When compared to a study from 4/1/2024, no significant interval changes were seen.    QUANTITATIVE DATA SUMMARY:     2D MEASUREMENTS:             Normal Ranges:  Ao Root s:       3.40 cm  LAs:             3.90 cm     (2.7-4.0cm)  RVIDd:           3.89 cm      (0.9-3.6cm)  IVSd:            1.19 cm     (0.6-1.1cm)  LVPWd:           1.06 cm     (0.6-1.1cm)  LVIDd:           4.59 cm     (3.9-5.9cm)  LVIDs:           3.63 cm  LV Mass Index:   90.9 g/m2  LVEDV Index:     49.05 ml/m2  LV % FS          21.1 %       LEFT ATRIUM:                 Normal Ranges:  LA Vol A4C:       55.9 ml    (22+/-6mL/m2)  LA Vol A2C:       83.1 ml  LA Vol BP:        68.7 ml  LA Vol Index A4C: 27.3ml/m2  LA Vol Index A2C: 40.6 ml/m2  LA Vol Index BP:  33.6 ml/m2  LA Vol A4C:       54.0 ml  LA Vol A2C:       77.2 ml  LA Vol Index BSA: 32.0 ml/m2       LV SYSTOLIC FUNCTION:                       Normal Ranges:  EF-A4C View:    46 % (>=55%)  EF-A2C View:    41 %  EF-Biplane:     44 %  EF-Visual:      33 %  LV EF Reported: 33 %       AORTIC VALVE:          Normal Ranges:  LVOT Diameter: 2.57 cm (1.8-2.4cm)       RIGHT VENTRICLE:  RV Basal 3.40 cm  RV Mid   2.20 cm  RV Major 6.3 cm       TRICUSPID VALVE/RVSP:         Normal Ranges:  Est. RA Pressure:     3 mmHg  IVC Diam:             1.65 cm       AORTA:  Asc Ao Diam 4.04 cm       [id number] Yoselin Umana MD, FACC  Electronically signed on 6/10/2025 at 4:47:49 PM         ** Final **         1:40 PM      Results were successfully communicated with the spouse and they acknowledged their understanding.

## 2025-06-11 NOTE — TELEPHONE ENCOUNTER
----- Message from Yoselin Umana sent at 6/11/2025  1:22 PM EDT -----  Let him know his ejection fraction remains unchanged  ----- Message -----  From: Alicia, Syngo - Cardiology Results In  Sent: 6/10/2025   4:47 PM EDT  To: Yoselin Umana MD

## 2025-07-11 DIAGNOSIS — R10.9 STOMACH PAIN: ICD-10-CM

## 2025-07-11 RX ORDER — PANTOPRAZOLE SODIUM 40 MG/1
40 TABLET, DELAYED RELEASE ORAL 2 TIMES DAILY
Qty: 180 TABLET | Refills: 3 | Status: SHIPPED | OUTPATIENT
Start: 2025-07-11

## 2025-07-16 DIAGNOSIS — E78.2 MIXED HYPERLIPIDEMIA: ICD-10-CM

## 2025-07-16 RX ORDER — EZETIMIBE 10 MG/1
10 TABLET ORAL DAILY
Qty: 90 TABLET | Refills: 3 | Status: SHIPPED | OUTPATIENT
Start: 2025-07-16

## 2025-08-12 DIAGNOSIS — E78.2 MIXED HYPERLIPIDEMIA: ICD-10-CM

## 2025-08-12 DIAGNOSIS — I25.10 CORONARY ARTERY DISEASE, UNSPECIFIED VESSEL OR LESION TYPE, UNSPECIFIED WHETHER ANGINA PRESENT, UNSPECIFIED WHETHER NATIVE OR TRANSPLANTED HEART: ICD-10-CM

## 2025-08-12 RX ORDER — EVOLOCUMAB 140 MG/ML
INJECTION, SOLUTION SUBCUTANEOUS
Qty: 6 ML | Refills: 3 | Status: SHIPPED | OUTPATIENT
Start: 2025-08-12

## 2025-10-16 ENCOUNTER — APPOINTMENT (OUTPATIENT)
Dept: CARDIOLOGY | Facility: CLINIC | Age: 75
End: 2025-10-16
Payer: MEDICARE